# Patient Record
Sex: MALE | Race: WHITE | Employment: FULL TIME | URBAN - METROPOLITAN AREA
[De-identification: names, ages, dates, MRNs, and addresses within clinical notes are randomized per-mention and may not be internally consistent; named-entity substitution may affect disease eponyms.]

---

## 2021-03-22 ENCOUNTER — IMMUNIZATION (OUTPATIENT)
Dept: NURSING | Facility: CLINIC | Age: 56
End: 2021-03-22
Payer: COMMERCIAL

## 2021-03-22 PROCEDURE — 91300 PR COVID VAC PFIZER DIL RECON 30 MCG/0.3 ML IM: CPT

## 2021-03-22 PROCEDURE — 0001A PR COVID VAC PFIZER DIL RECON 30 MCG/0.3 ML IM: CPT

## 2021-04-12 ENCOUNTER — IMMUNIZATION (OUTPATIENT)
Dept: NURSING | Facility: CLINIC | Age: 56
End: 2021-04-12
Attending: INTERNAL MEDICINE
Payer: COMMERCIAL

## 2021-04-12 PROCEDURE — 0002A PR COVID VAC PFIZER DIL RECON 30 MCG/0.3 ML IM: CPT

## 2021-04-12 PROCEDURE — 91300 PR COVID VAC PFIZER DIL RECON 30 MCG/0.3 ML IM: CPT

## 2021-05-01 ENCOUNTER — HEALTH MAINTENANCE LETTER (OUTPATIENT)
Age: 56
End: 2021-05-01

## 2021-10-11 ENCOUNTER — HEALTH MAINTENANCE LETTER (OUTPATIENT)
Age: 56
End: 2021-10-11

## 2022-06-26 ENCOUNTER — HEALTH MAINTENANCE LETTER (OUTPATIENT)
Age: 57
End: 2022-06-26

## 2022-10-10 ENCOUNTER — HOSPITAL ENCOUNTER (OUTPATIENT)
Dept: NUCLEAR MEDICINE | Facility: CLINIC | Age: 57
Setting detail: NUCLEAR MEDICINE
Discharge: HOME | End: 2022-10-10
Attending: UROLOGY
Payer: COMMERCIAL

## 2022-10-10 ENCOUNTER — HOSPITAL ENCOUNTER (OUTPATIENT)
Dept: NUCLEAR MEDICINE | Facility: CLINIC | Age: 57
Setting detail: OBSERVATION
Discharge: HOME | End: 2022-10-10
Attending: UROLOGY
Payer: COMMERCIAL

## 2022-10-10 DIAGNOSIS — C61 MALIGNANT TUMOR OF PROSTATE (H): ICD-10-CM

## 2022-10-10 PROCEDURE — A9503 TC99M MEDRONATE: HCPCS | Performed by: UROLOGY

## 2022-10-10 PROCEDURE — 343N000001 HC RX 343: Performed by: UROLOGY

## 2022-10-10 PROCEDURE — 78306 BONE IMAGING WHOLE BODY: CPT

## 2022-10-10 RX ORDER — TC 99M MEDRONATE 20 MG/10ML
25 INJECTION, POWDER, LYOPHILIZED, FOR SOLUTION INTRAVENOUS ONCE
Status: COMPLETED | OUTPATIENT
Start: 2022-10-10 | End: 2022-10-10

## 2022-10-10 RX ADMIN — TC 99M MEDRONATE 25.1 MCI.: 20 INJECTION, POWDER, LYOPHILIZED, FOR SOLUTION INTRAVENOUS at 12:00

## 2022-10-17 ENCOUNTER — HOSPITAL ENCOUNTER (OUTPATIENT)
Facility: CLINIC | Age: 57
End: 2022-10-17
Attending: UROLOGY | Admitting: UROLOGY
Payer: COMMERCIAL

## 2022-11-20 ENCOUNTER — HEALTH MAINTENANCE LETTER (OUTPATIENT)
Age: 57
End: 2022-11-20

## 2023-04-12 LAB
ALT SERPL-CCNC: 15 U/L (ref 9–46)
AST SERPL-CCNC: 18 U/L (ref 10–35)
CREATININE (EXTERNAL): 0.83 MG/DL (ref 0.7–1.3)
GFR ESTIMATED (EXTERNAL): 103 ML/MIN/1.73M2
GLUCOSE (EXTERNAL): 94 MG/DL (ref 65–99)
HBA1C MFR BLD: 5.2 %
POTASSIUM (EXTERNAL): 4.6 MMOL/L (ref 3.5–5.3)
TSH SERPL-ACNC: 2.4 MIU/L (ref 0.4–4.5)

## 2023-04-17 ENCOUNTER — TRANSFERRED RECORDS (OUTPATIENT)
Dept: HEALTH INFORMATION MANAGEMENT | Facility: CLINIC | Age: 58
End: 2023-04-17

## 2023-04-24 ENCOUNTER — TRANSFERRED RECORDS (OUTPATIENT)
Dept: HEALTH INFORMATION MANAGEMENT | Facility: CLINIC | Age: 58
End: 2023-04-24

## 2023-07-08 ENCOUNTER — HEALTH MAINTENANCE LETTER (OUTPATIENT)
Age: 58
End: 2023-07-08

## 2023-12-04 ENCOUNTER — TRANSFERRED RECORDS (OUTPATIENT)
Dept: HEALTH INFORMATION MANAGEMENT | Facility: CLINIC | Age: 58
End: 2023-12-04
Payer: COMMERCIAL

## 2023-12-04 ENCOUNTER — MEDICAL CORRESPONDENCE (OUTPATIENT)
Dept: HEALTH INFORMATION MANAGEMENT | Facility: CLINIC | Age: 58
End: 2023-12-04
Payer: COMMERCIAL

## 2023-12-06 ENCOUNTER — TRANSCRIBE ORDERS (OUTPATIENT)
Dept: OTHER | Age: 58
End: 2023-12-06

## 2023-12-06 DIAGNOSIS — G62.9 PERIPHERAL NEUROPATHY: Primary | ICD-10-CM

## 2023-12-07 NOTE — TELEPHONE ENCOUNTER
RECORDS RECEIVED FROM: external   REASON FOR VISIT: Peripheral Neuropathy    Date of Appt: 12/11/23   NOTES (FOR ALL VISITS) STATUS DETAILS   OFFICE NOTE from referring provider Received Dr Martinez @ [facility]:  12/4/23 4/11/23   EMG Received [facility]:  4/24/23   MEDICATION LIST Received    IMAGING  (FOR ALL VISITS)     MRI (HEAD, NECK, SPINE) N/A    CT (HEAD, NECK, SPINE) N/A

## 2023-12-11 ENCOUNTER — LAB (OUTPATIENT)
Dept: LAB | Facility: CLINIC | Age: 58
End: 2023-12-11
Payer: COMMERCIAL

## 2023-12-11 ENCOUNTER — OFFICE VISIT (OUTPATIENT)
Dept: NEUROLOGY | Facility: CLINIC | Age: 58
End: 2023-12-11
Attending: PSYCHIATRY & NEUROLOGY
Payer: COMMERCIAL

## 2023-12-11 ENCOUNTER — PRE VISIT (OUTPATIENT)
Dept: NEUROLOGY | Facility: CLINIC | Age: 58
End: 2023-12-11

## 2023-12-11 VITALS
HEART RATE: 80 BPM | WEIGHT: 270 LBS | OXYGEN SATURATION: 97 % | SYSTOLIC BLOOD PRESSURE: 158 MMHG | BODY MASS INDEX: 32.88 KG/M2 | DIASTOLIC BLOOD PRESSURE: 93 MMHG | HEIGHT: 76 IN

## 2023-12-11 DIAGNOSIS — G60.9 HEREDITARY AND IDIOPATHIC PERIPHERAL NEUROPATHY: Primary | ICD-10-CM

## 2023-12-11 PROBLEM — G62.9 PERIPHERAL NEUROPATHY: Status: ACTIVE | Noted: 2023-12-11

## 2023-12-11 PROBLEM — R20.0 NUMBNESS AND TINGLING OF BOTH FEET: Status: ACTIVE | Noted: 2023-12-11

## 2023-12-11 PROBLEM — R20.2 NUMBNESS AND TINGLING OF BOTH FEET: Status: ACTIVE | Noted: 2023-12-11

## 2023-12-11 LAB — VIT B12 SERPL-MCNC: 353 PG/ML (ref 232–1245)

## 2023-12-11 PROCEDURE — 84207 ASSAY OF VITAMIN B-6: CPT | Mod: 90 | Performed by: PATHOLOGY

## 2023-12-11 PROCEDURE — 99205 OFFICE O/P NEW HI 60 MIN: CPT | Performed by: PSYCHIATRY & NEUROLOGY

## 2023-12-11 PROCEDURE — 82607 VITAMIN B-12: CPT | Performed by: PSYCHIATRY & NEUROLOGY

## 2023-12-11 PROCEDURE — 99000 SPECIMEN HANDLING OFFICE-LAB: CPT | Performed by: PATHOLOGY

## 2023-12-11 PROCEDURE — 84425 ASSAY OF VITAMIN B-1: CPT | Mod: 90 | Performed by: PATHOLOGY

## 2023-12-11 PROCEDURE — 36415 COLL VENOUS BLD VENIPUNCTURE: CPT | Performed by: PATHOLOGY

## 2023-12-11 RX ORDER — NAPROXEN SODIUM 220 MG
220 TABLET ORAL 2 TIMES DAILY WITH MEALS
COMMUNITY

## 2023-12-11 RX ORDER — IBUPROFEN 100 MG/1
100 TABLET, CHEWABLE ORAL EVERY 8 HOURS PRN
COMMUNITY

## 2023-12-11 RX ORDER — GABAPENTIN 300 MG/1
300 CAPSULE ORAL 3 TIMES DAILY
Qty: 90 CAPSULE | Refills: 5 | Status: SHIPPED | OUTPATIENT
Start: 2023-12-11

## 2023-12-11 ASSESSMENT — PAIN SCALES - GENERAL: PAINLEVEL: MODERATE PAIN (4)

## 2023-12-11 NOTE — PROGRESS NOTES
Chief Complaint: sensory changes in feet    History of Present Illness:    Timoteo Haq is a 58 year old man who I am seeing at the request of Dr. Martinez at [facility] for neuropathy evaluation. Onset of symptoms was probably around 2016. The initial location of symptoms was the top of the big toe on both sides. He did not have pain or paresthesias, but felt that the toe was numb. In the years 2016 to 2022 there was not much change in the toe symptoms. In 2022 he was diagnosed with prostate cancer and in late 2022 underwent prostate surgery. He never received chemotherapy. In 2022, before surgery, his feet changed. The sensations in his toes evolved to include paresthesias. The location spread to include the top of his foot. He has not experienced numbness on the bottom of his feet. He now also experiences sharp pain in his feet that that is intermittent, but also has constant discomfort in both feet. Walking does not worsen the pain. Sometimes the discomfort keeps him wake at night. Over the last year he has noticed that his balance has worked. He is still active with tennis, walking and hiking - but feels like he is more unsteady. No falls or gait devices.      He has not experienced changes in sweating, anhidrosis, syncope, or early satiety and has not noticed changes in bowel. Bladder changes have developed in tandem with prostate cancer. He denies frequent cramps, fasciculations, or difficulty with muscle stiffness and muscle relaxation. Speech and swallowing are normal. He lost about 100 pounds in 2022 in the 6 months leading into prostate surgery. This was an intentional weight lost.     When he saw Dr. Martinez it was recommend that he start gabapentin and PT. He has not done either of these yet.      Prior pertinent laboratory work-up:  6/23/22: Normal Hba1c (5.6), TSH  6/19/23: Negative RF  11/20/23: Negative Heb B, Hep C, HIV, YARELY, SPEP (no monoclonal protein)    Prior electrophysiologic  "work-up:  4/24/23: Nerve conduction studies/EMG performed at [facility]. Sural small or absent. CV in lower limb motor nerves all around 36 m/s. Tibial small in amplitude. Upper limb motor and sensory responses normal.     Past Medical History:   Prostate surgery    Past Surgical History:  Prostate surgery  Cholecystectomy  YESENIA  Back pain    Family history:    He thinks his mother had \"neuropathy\". Details otherwise unknown. There is no other known family history of hereditary neuropathies or other neuromuscular disorders.    Social History:    Very rare alcohol use. He denies tobacco or illicit drug use.     Medical Allergies:  No Known Allergies    Current Medications:    Current Outpatient Medications   Medication    ibuprofen (ADVIL/MOTRIN) 100 MG chewable tablet    Multiple Vitamin (MULTIVITAMINS PO)    naproxen sodium (ANAPROX) 220 MG tablet     No current facility-administered medications for this visit.     Review of Systems: A complete review of systems was obtained and was negative except for what was noted above.    Physical examination:    BP (!) 158/93   Pulse 80   Ht 1.93 m (6' 4\")   Wt 122.5 kg (270 lb)   SpO2 97%   BMI 32.87 kg/m       General Appearance: NAD    Skin: There are no rashes or other skin lesions.    Musculoskeletal:  Mild pes cavus    Neurologic examination:    Mental status:  Patient is alert, attentive, and oriented x 3.  Language is coherent and fluent without aphasia.  Memory, comprehension and ability to follow commands were intact.       Cranial nerves:  Pupils were round and reacted to light.  Extraocular movements were full. There was no face, jaw, palate or tongue weakness or atrophy. Hearing was grossly intact.  Shoulder shrug was normal.       Motor exam: No atrophy or fasciculations.  Manual muscle testing revealed MRC grade 5/5 strength throughout including proximal and distal muscles of the arms and legs.    Complex motor skills: No tremor or ataxia    Sensory exam: " Vibration reduced to about 10 seconds at ankles and a couple seconds toes. Pin reduced in distal 1/3rd of leg. Patchy but probably becomes more dense distally. Sensory exam normal in hands.     Gait: Narrow and stable.  He was able to walk on his heels and toes. He could perform tandem but was unsteady.     Deep tendon reflexes:   Right Left   Triceps 1 1   Biceps 1 1   Brachioradialis 2 2   Knee jerk 2 2   Ankle jerk 1 1   Plantar responses were flexor bilaterally.       Assessment:    Timoteo Haq is a 58 year old man with clinical findings suggestive of a mild distal sensory polyneuropathy. NCS from [facility] were curious in that motor CV were in the indeterminate range. It is also notable that, although some symptoms seem to have begun 7 years ago, there was a drastic ramp up in 2022 which occurred in the context of about a 100 pound weight loss (intentional). We discussed a diagnosis of polyneuropathy today, including idiopathic neuropathy. I would like to look for nutritional cause of neuropathy (although since his weight has stabilized it is possible that we won't be able to detect a nutritional problem even if that were the cause of his neuropathy). Regarding idiopathic polyneuropathy, this can be a frustrating diagnosis. We are learning that some late-onset (ie., symptoms beginning after age 35 years) neuropathies have a genetically determined etiology. In one series of 230 patients with late-onset neuropathy 18% were found to have a pathogenic mutation that explained their neuropathy (Steviek et al. Neurology 2020). Considering his age genetically determined etiologies are worth exploring. Although CANVAS is not a high concern, will try to include RFC1 in the genetic analysis. We discussed treatment in some detail today. In the absence of a known cause of neuropathy and in the case of genetically determined neuropathies there is unfortunately no specific disease modifying interventions. Treating the  neuropathy and preventing progression is his primary concern. Although the lack of treatment for idiopathic neuropathy is frustrating the prognosis is generally favorable. Management is supportive. We discussed supportive management of neuropathy, as detailed below. All questions answered. I will see him back in 6 months, but will be in touch with him before that when new information becomes available.     Plan:      1. Labs:B1, B12, B6  2. Referral to genetics, Jolanta Rae, for genetic testing (Invitae panel of 102 genes. Also RFC1 if able).   3. Nerve conduction studies/EMG: Characterize polyneuropathy. Special interest in indeterminate CV slowing as was reported in the Noran study  4. Balance: Physical therapy referral provided  5. Symptomatic management of pain and paresthesias: Start gabapentin 300 mg TID (provided instructions on how to slowly increase from every day to TID). Discussed side effects, including sleepiness and drowsiness. If not tolerated then Cymbalta would be a good next option  6. Encouraged supportive, comfortable footwear  7. Follow up in 6 months. Sooner if needed pending above.  --  ADDENDUM: RFC1 testing quite expensive out of pocket. Will defer testing for that as suspicion for CANVAS spectrum low    4/19/24: Genetic testing dated 3/23/24 with Invitae panel of 102 CMT genes negative. Patient called by genetics, Jolanta Rae, on 4/19/24 to discuss results.

## 2023-12-11 NOTE — LETTER
12/11/2023       RE: Timoteo Haq  [address]  [facility] [address]       Dear Colleague,    Thank you for referring your patient, Timoteo Haq, to the [facility] [place] at [facility]. Please see a copy of my visit note below.    Chief Complaint: sensory changes in feet    History of Present Illness:    Timoteo Haq is a 58 year old man who I am seeing at the request of Dr. Martinez at [facility] for neuropathy evaluation. Onset of symptoms was probably around 2016. The initial location of symptoms was the top of the big toe on both sides. He did not have pain or paresthesias, but felt that the toe was numb. In the years 2016 to 2022 there was not much change in the toe symptoms. In 2022 he was diagnosed with prostate cancer and in late 2022 underwent prostate surgery. He never received chemotherapy. In 2022, before surgery, his feet changed. The sensations in his toes evolved to include paresthesias. The location spread to include the top of his foot. He has not experienced numbness on the bottom of his feet. He now also experiences sharp pain in his feet that that is intermittent, but also has constant discomfort in both feet. Walking does not worsen the pain. Sometimes the discomfort keeps him wake at night. Over the last year he has noticed that his balance has worked. He is still active with tennis, walking and hiking - but feels like he is more unsteady. No falls or gait devices.      He has not experienced changes in sweating, anhidrosis, syncope, or early satiety and has not noticed changes in bowel. Bladder changes have developed in tandem with prostate cancer. He denies frequent cramps, fasciculations, or difficulty with muscle stiffness and muscle relaxation. Speech and swallowing are normal. He lost about 100 pounds in 2022 in the 6 months leading into prostate surgery. This was an intentional weight lost.  "    When he saw Dr. Martinez it was recommend that he start gabapentin and PT. He has not done either of these yet.      Prior pertinent laboratory work-up:  6/23/22: Normal Hba1c (5.6), TSH  6/19/23: Negative RF  11/20/23: Negative Heb B, Hep C, HIV, YARELY, SPEP (no monoclonal protein)    Prior electrophysiologic work-up:  4/24/23: Nerve conduction studies/EMG performed at [facility]. Sural small or absent. CV in lower limb motor nerves all around 36 m/s. Tibial small in amplitude. Upper limb motor and sensory responses normal.     Past Medical History:   Prostate surgery    Past Surgical History:  Prostate surgery  Cholecystectomy  YESENIA  Back pain    Family history:    He thinks his mother had \"neuropathy\". Details otherwise unknown. There is no other known family history of hereditary neuropathies or other neuromuscular disorders.    Social History:    Very rare alcohol use. He denies tobacco or illicit drug use.     Medical Allergies:  No Known Allergies    Current Medications:    Current Outpatient Medications   Medication    ibuprofen (ADVIL/MOTRIN) 100 MG chewable tablet    Multiple Vitamin (MULTIVITAMINS PO)    naproxen sodium (ANAPROX) 220 MG tablet     No current facility-administered medications for this visit.     Review of Systems: A complete review of systems was obtained and was negative except for what was noted above.    Physical examination:    BP (!) 158/93   Pulse 80   Ht 1.93 m (6' 4\")   Wt 122.5 kg (270 lb)   SpO2 97%   BMI 32.87 kg/m       General Appearance: NAD    Skin: There are no rashes or other skin lesions.    Musculoskeletal:  Mild pes cavus    Neurologic examination:    Mental status:  Patient is alert, attentive, and oriented x 3.  Language is coherent and fluent without aphasia.  Memory, comprehension and ability to follow commands were intact.       Cranial nerves:  Pupils were round and reacted to light.  Extraocular movements were full. There was no face, jaw, palate or tongue " weakness or atrophy. Hearing was grossly intact.  Shoulder shrug was normal.       Motor exam: No atrophy or fasciculations.  Manual muscle testing revealed MRC grade 5/5 strength throughout including proximal and distal muscles of the arms and legs.    Complex motor skills: No tremor or ataxia    Sensory exam: Vibration reduced to about 10 seconds at ankles and a couple seconds toes. Pin reduced in distal 1/3rd of leg. Patchy but probably becomes more dense distally. Sensory exam normal in hands.     Gait: Narrow and stable.  He was able to walk on his heels and toes. He could perform tandem but was unsteady.     Deep tendon reflexes:   Right Left   Triceps 1 1   Biceps 1 1   Brachioradialis 2 2   Knee jerk 2 2   Ankle jerk 1 1   Plantar responses were flexor bilaterally.       Assessment:    Timoteo Haq is a 58 year old man with clinical findings suggestive of a mild distal sensory polyneuropathy. NCS from [facility] were curious in that motor CV were in the indeterminate range. It is also notable that, although some symptoms seem to have begun 7 years ago, there was a drastic ramp up in 2022 which occurred in the context of about a 100 pound weight loss (intentional). We discussed a diagnosis of polyneuropathy today, including idiopathic neuropathy. I would like to look for nutritional cause of neuropathy (although since his weight has stabilized it is possible that we won't be able to detect a nutritional problem even if that were the cause of his neuropathy). Regarding idiopathic polyneuropathy, this can be a frustrating diagnosis. We are learning that some late-onset (ie., symptoms beginning after age 35 years) neuropathies have a genetically determined etiology. In one series of 230 patients with late-onset neuropathy 18% were found to have a pathogenic mutation that explained their neuropathy (John et al. Neurology 2020). Considering his age genetically determined etiologies are worth exploring. Although  CANVAS is not a high concern, will try to include RFC1 in the genetic analysis. We discussed treatment in some detail today. In the absence of a known cause of neuropathy and in the case of genetically determined neuropathies there is unfortunately no specific disease modifying interventions. Treating the neuropathy and preventing progression is his primary concern. Although the lack of treatment for idiopathic neuropathy is frustrating the prognosis is generally favorable. Management is supportive. We discussed supportive management of neuropathy, as detailed below. All questions answered. I will see him back in 6 months, but will be in touch with him before that when new information becomes available.     Plan:      1. Labs:B1, B12, B6  2. Referral to genetics, Jolanta Rae, for genetic testing (Invitae panel of `102 genes. Also RFC1 if able).   3. Nerve conduction studies/EMG: Characterize polyneuropathy. Special interest in indeterminate CV slowing as was reported in the Noran study  4. Balance: Physical therapy referral provided  5. Symptomatic management of pain and paresthesias: Start gabapentin 300 mg TID (provided instructions on how to slowly increase from every day to TID). Discussed side effects, including sleepiness and drowsiness. If not tolerated then Cymbalta would be a good next option  6. Encouraged supportive, comfortable footwear  7. Follow up in 6 months. Sooner if needed pending above.  --      Again, thank you for allowing me to participate in the care of your patient.      Sincerely,    Jh Chapin MD

## 2023-12-11 NOTE — PATIENT INSTRUCTIONS
We will do some blood tests to look for causes of your neuropathy  I made a referral to our genetics department. Jolanta Rae should be in touch with you. She will help us get genetic testing for causes of neuropathy  A referral has been made for physical therapy. They will reach out to you to work on your balance  We will set up another EMG to look at your nerves  Give gabapentin a try for the tingling and burning sensations. Start 300 mg once per day x 1 week, then twice per day x 1 week then 3 times per day

## 2023-12-11 NOTE — NURSING NOTE
Chief Complaint   Patient presents with    Consult     referral   BP (!) 158/93   Pulse 80   SpO2 97%     Jaye Cruz CMA at 7:59 AM on 12/11/2023.

## 2023-12-12 ENCOUNTER — TELEPHONE (OUTPATIENT)
Dept: CONSULT | Facility: CLINIC | Age: 58
End: 2023-12-12

## 2023-12-12 NOTE — TELEPHONE ENCOUNTER
LVM for patient to call me back directly to schedule GC only visit with Jolanta/Kasandra/Joanne in Neuro slot.

## 2023-12-14 LAB — PYRIDOXAL PHOS SERPL-SCNC: 61.3 NMOL/L

## 2023-12-15 LAB — VIT B1 PYROPHOSHATE BLD-SCNC: 141 NMOL/L

## 2024-01-08 ENCOUNTER — THERAPY VISIT (OUTPATIENT)
Dept: PHYSICAL THERAPY | Facility: CLINIC | Age: 59
End: 2024-01-08
Attending: PSYCHIATRY & NEUROLOGY
Payer: COMMERCIAL

## 2024-01-08 DIAGNOSIS — G62.9 PERIPHERAL NEUROPATHY: Primary | ICD-10-CM

## 2024-01-08 DIAGNOSIS — R26.89 IMPAIRMENT OF BALANCE: ICD-10-CM

## 2024-01-08 PROCEDURE — 97112 NEUROMUSCULAR REEDUCATION: CPT | Mod: GP

## 2024-01-08 PROCEDURE — 97161 PT EVAL LOW COMPLEX 20 MIN: CPT | Mod: GP

## 2024-01-08 NOTE — PROGRESS NOTES
PHYSICAL THERAPY EVALUATION  Type of Visit: Evaluation    See electronic medical record for Abuse and Falls Screening details.    Subjective       Presenting condition or subjective complaint: Physical therapy recommended for balance issues due to neuropathy  Date of onset: 01/01/16    Relevant medical history: Cancer; Overweight; Sleep disorder like apnea   Dates & types of surgery: 12/14/2022 Prostate cancer, 8/19/2023 Gall-bladder    Prior diagnostic imaging/testing results: EMG     Prior therapy history for the same diagnosis, illness or injury: No      Prior Level of Function  Transfers: Independent  Ambulation: Independent  ADL: Independent  IADL:  IND    Living Environment  Social support: With a significant other or spouse   Type of home: House   Stairs to enter the home: Yes 5 Is there a railing: Yes   Ramp: No   Stairs inside the home: Yes 24 Is there a railing: Yes   Help at home: None  Equipment owned:       Employment: Yes   Hobbies/Interests: Hiking, tennis, kayaking, golf    Patient goals for therapy: improve balance    Pain assessment: Pain denied     Objective   Cognitive Status Examination  Orientation: Oriented to person, place and time   Level of Consciousness: Alert  Follows Commands and Answers Questions: 100% of the time  Personal Safety and Judgement: Intact  Memory: Intact    OBSERVATION:   POSTURE: WFL  Standing Posture: Forward head   PALPATION:    RANGE OF MOTION:  WFL  STRENGTH:  B hip flexor 5/5, B quad 5/5, B ham 5/5, B DF 4+/5, B eversion 4/5, B inversion 5/5, B hip abduction 5/5, B hip extension 4/5, B gastroc 5/5    BED MOBILITY: WNL    TRANSFERS: WNL    GAIT:   Gait Description: Pt ambulates with even step lengths, very occasional misstep with R foot    BALANCE:     SPECIAL TESTS  Functional Gait Assessment (FGA) TOTAL SCORE: (MAXIMUM SCORE 30): 25  (<22/30 indicates fall risk)   10 Meter Walk Test (Comfortable)  5.09 sec = 1.18 m/s   6 Minute Walk Test (6MWT)          Valenzuela Balance Scale (BBS)    (<45/56 indicates fall risk)   5 Times Sit-to-Stand (5TSTS)       Dynamic Gait Index (DGI)     (<19/24 indicates fall risk)   Timed Up and Go (TUG) - sec    Single Leg Stance Right (sec) 15 sec   Single Leg Stance Left (sec) 15 sec   Modified CTSIB Conditions (sec) Cond 1: 30 sec  Cond 2: 30 sec  Cond 4: 30 sec  Cond 5 : 30 sec   Romberg  (sec)    Sharpened Romberg (sec) 30 sec ea side   Sharpened Romberg, EC (sec) 3 sec ea side       SENSATION:  reports numbness and paresthesias along B dorsal and medial aspects of feet    REFLEXES:   COORDINATION: WFL  MUSCLE TONE:     VESTIBULAR  Cervicogenic Screen    Neck ROM Normal     Oculomotor Screen    Ocular ROM Normal   Smooth Pursuit Normal   Saccades Normal   VOR Normal   VOR Cancellation    Head Impulse Test Abnormal and R sided corrective saccade, non-repeatable   Convergence Testing           Dynamic Visual Acuity (DVA)    Static Acuity (LogMar) 0.1   Horizontal Head Movement at 1 Hz (LogMar)    Horizontal Head Movement at 2 Hz (LogMar) 0.7   6 line difference, with slight dizziness, seated, 10 ft away         Assessment & Plan   CLINICAL IMPRESSIONS  Medical Diagnosis: Peripheral neuropathy    Treatment Diagnosis: Imbalance   Impression/Assessment: Patient is a 58 year old male with balance complaints.  The following significant findings have been identified: Impaired balance, Impaired sensation, Impaired gait, Instability, and Dizziness. These impairments interfere with their ability to perform recreational activities, household chores, and community mobility as compared to previous level of function.     Clinical Decision Making (Complexity):  Clinical Presentation: Stable/Uncomplicated  Clinical Presentation Rationale: based on medical and personal factors listed in PT evaluation  Clinical Decision Making (Complexity): Low complexity    PLAN OF CARE  Treatment Interventions:  Modalities: E-stim  Interventions: Gait Training, Manual  Therapy, Neuromuscular Re-education, Therapeutic Activity, Therapeutic Exercise    Long Term Goals     PT Goal 1  Goal Identifier: SLS  Goal Description: Pt will be able to maintain balance in SLS for at least 30 sec ea side in order to reduce risk of falls when playing tennis and hiking  Goal Progress: eval: 15 sec ea side  Target Date: 03/07/24  PT Goal 2  Goal Identifier: DVA  Goal Description: Pt will perform DVA with a 4 line degradation or less w/o dizziness in order to perform ADL's  Goal Progress: eval: 6 line difference, slight dizziness  Target Date: 03/07/24  PT Goal 3  Goal Identifier: FGA  Goal Description: Pt will score at least 28/30 on the FGA in order to indicate reduced risk of falls when ambulating in the community  Goal Progress: eval: 25/30  Target Date: 03/07/24  PT Goal 4  Goal Identifier: HEP  Goal Description: Pt will be independent with HEP at least 3x/week in order to improve self management of impairments  Target Date: 03/07/24      Frequency of Treatment: 1x/week  Duration of Treatment: 60 days    Recommended Referrals to Other Professionals:   Education Assessment:   Learner/Method: Patient    Risks and benefits of evaluation/treatment have been explained.   Patient/Family/caregiver agrees with Plan of Care.     Evaluation Time:     PT Daniel Low Complexity Minutes (55586): 30       Signing Clinician: Kathy Benítez PT

## 2024-01-15 ENCOUNTER — THERAPY VISIT (OUTPATIENT)
Dept: PHYSICAL THERAPY | Facility: CLINIC | Age: 59
End: 2024-01-15
Attending: PSYCHIATRY & NEUROLOGY
Payer: COMMERCIAL

## 2024-01-15 DIAGNOSIS — R26.89 IMPAIRMENT OF BALANCE: ICD-10-CM

## 2024-01-15 DIAGNOSIS — G60.3 IDIOPATHIC PROGRESSIVE NEUROPATHY: Primary | ICD-10-CM

## 2024-01-15 PROCEDURE — 97112 NEUROMUSCULAR REEDUCATION: CPT | Mod: GP

## 2024-01-22 ENCOUNTER — THERAPY VISIT (OUTPATIENT)
Dept: PHYSICAL THERAPY | Facility: CLINIC | Age: 59
End: 2024-01-22
Attending: PSYCHIATRY & NEUROLOGY
Payer: COMMERCIAL

## 2024-01-22 DIAGNOSIS — R26.89 IMPAIRMENT OF BALANCE: ICD-10-CM

## 2024-01-22 DIAGNOSIS — G60.3 IDIOPATHIC PROGRESSIVE NEUROPATHY: Primary | ICD-10-CM

## 2024-01-22 PROCEDURE — 97112 NEUROMUSCULAR REEDUCATION: CPT | Mod: GP

## 2024-01-24 ENCOUNTER — VIRTUAL VISIT (OUTPATIENT)
Dept: CONSULT | Facility: CLINIC | Age: 59
End: 2024-01-24
Attending: PSYCHIATRY & NEUROLOGY
Payer: COMMERCIAL

## 2024-01-24 DIAGNOSIS — G60.9 IDIOPATHIC PERIPHERAL NEUROPATHY: Primary | ICD-10-CM

## 2024-01-24 DIAGNOSIS — Z71.83 ENCOUNTER FOR NONPROCREATIVE GENETIC COUNSELING AND TESTING: ICD-10-CM

## 2024-01-24 DIAGNOSIS — Z13.71 ENCOUNTER FOR NONPROCREATIVE GENETIC COUNSELING AND TESTING: ICD-10-CM

## 2024-01-24 PROCEDURE — 96040 HC GENETIC COUNSELING, EACH 30 MINUTES: CPT | Mod: GT,95 | Performed by: GENETIC COUNSELOR, MS

## 2024-01-24 NOTE — LETTER
2024      RE: Timoteo Haq  [address]  [facility] [address]     Dear Colleague,    Thank you for the opportunity to participate in the care of your patient, Timoteo Haq, at the [facility] at [facility]. Please see a copy of my visit note below.    Virtual Visit Details    Type of service:  Video Visit     Originating Location (pt. Location): Home  {PROVIDER LOCATION On-site should be selected for visits conducted from your clinic location or adjoining [facility] hospital, academic office, or other nearby [facility] building. Off-site should be selected for all other provider locations, including home:[id number]}  Distant Location (provider location):  On-site  Platform used for Video Visit: Saulo    Name:  Timoteo Haq  :   1965  MRN:   [id number]  Date of service: 2024  Referring Provider: Jh Chapin    Genetic Counseling Consultation Note    Presenting Information:  A consultation in the [facility] was requested for Timoteo, a 58 year old male, for evaluation of neuropathy. This consultation was requested by Dr. Chapin in Adult Neurology at the patient's visit on 2023.    Timoteo attended this visit conducted by video alone.    History is obtained from Timoteo and the medical record. I met with the family at the request of Dr. Chapin to obtain a personal and family history, discuss possible genetic contributions to his symptoms, and to obtain informed consent for genetic testing.    Personal History:   Timoteo has a history of neuropathy with onset in his mid/late 40s (he reports his first symptoms occurred in ). Timoteo also has a history of prostate cancer.     Patient Active Problem List   Diagnosis     Peripheral neuropathy     Numbness and tingling of both feet     Previous Genetic Testing  Timoteo reports that his Urologist ordered genetic testing for  Timoteo after his diagnosis of prostate cancer. 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 A direct copy of the report was not available for my review.    Family History:   A standard three generation pedigree was obtained and is scanned into the medical record.  History pertinent to referral is underlined.  Children: NA  Siblings:   Full siblings:   60 y/o brother, history of prostate cancer diagnosed at 55 y/o  Paternal:   Father:  at 71 y/o d/t idiopathic pulmonary fibrosis  Paternal grandfather:  in 60s d/t stomach cancer  Paternal grandmother:  in 90s  Paternal aunts/uncles:   Uncle  d/t complications of pulmonary fibrosis  Aunt, living and no health concerns known  10 additional  aunts and uncles for whom no health information is known  Paternal cousins: Numerous, at least 1 male cousin  d/t complications of MI  Maternal:   Mother:  at 76 y/o. She is noted to have neuropathy. She also has a history of breast cancer diagnosed in her late 60s  Maternal grandfather:  in early 90s  Maternal grandmother:  in early 90s. History of breast cancer  Maternal aunts/uncles:   3 aunts, all of whom had breast cancer. 2 of these aunts are in their late 80s and 1  in their 90s  Uncle who is     There are no additional reports of family members with neuropathy or cancer. Paternal ancestry is of [place] descent.  Maternal ancestry is of [place] descent. Consanguinity is denied.     Genetic Counseling Discussion:  Discussed with Timoteo that his personal and family history of cancer, especially considering his positive genetic test results, should be reviewed with an expert. Timoteo states that he has been referred to the cancer genetic counseling group at [facility], but has not scheduled an appointment yet. I encouraged him to do so. We discussed that the HOXB13 gene has been associated  with increased risk for prostate cancer. To my knowledge, this gene has not been associated with neuropathy and would not be included on the genetic testing that I order.    Our nervous system is composed of two parts: the central nervous system and the peripheral nervous system.  The central nervous system refers to the brain and spinal cord.  The peripheral nervous system refers to the nerves that branch off from the spinal cord and connect to all the parts of our body.  The central nervous system and peripheral nervous system are in constant communication, sending signals to and from each other.  Neuropathy is a medical condition that refers to the damage or reduced function of nerves.  One subset of neuropathy is peripheral neuropathy, referring to neuropathy of the peripheral nervous system.      Neuropathy can be due to many causes, including both acquired forms and genetic forms.  Some examples of acquired causes of peripheral neuropathy are trauma, diabetes, and autoimmune disease.  However, there are countless other causes of acquired peripheral neuropathy.  Genetic causes of peripheral neuropathy are much rarer than acquired forms of neuropathy. When neuropathy is due to a genetic cause, there may or may not be a family history of neuropathy or other symptoms associated with the condition.    At this time, we are unable to target genetic testing for a specific condition or gene for Timoteo.  In situations like this, we recommend examining numerous genes associated with the presenting symptom.  For Timoteo, we are targeting genes associated with neuropathy.  We discussed the details, limitations, and possible outcomes of next generation sequencing gene panels.  There are three types of results:  Negative: meaning no pathogenic variants were identified in the genes that were tested  A negative result does not rule out the possibility that Timoteo's symptoms are due to a genetic etiology  Positive: meaning one  (or more) pathogenic variants were identified in the genes that were tested that are associated with Timoteo's symptoms  We discussed that a positive result could provide an etiology to Timoteo's symptoms, give anticipatory guidance as to their potential progression, and clarify risks to family members.  We also discussed that a positive result could indicate that Timoteo is at risks for other health concerns, outside of their presenting symptoms  Uncertain: meaning one (or more) variants were identified in the genes that were tested, but there is not enough data to know if this variant is disease-causing; these are called variants of uncertain significance (VUS)  In most cases, identification of a VUS does not confirm a diagnosis and does not result in any clinically actionable recommendations.  The variant will be monitored over time to see if more information is known about it in the future.  If a VUS is identified, testing of other relatives may be helpful to provide clarification.    We discussed the potential benefits of genetic testing and why this genetic testing is medically indicated. A positive result will help determine the etiology of neuropathy noted in Timoteo and could guide medical management for Timoteo. If a genetic cause is found for Timoteo, it will give us a more accurate risk assessment for other family members.  Thus, the recommended testing for Timoteo  is DIAGNOSTIC testing, and it is NOT investigational.    [employer] offers sponsored testing programs. Participation in these programs is completely voluntary. Under these programs, third party biopharmaceutical companies sponsor the cost of testing for eligible patients. If Timoteo chooses to participate in a sponsored testing program, he agrees that [facility] may share their de-identified genetic test results and clinical information and the contact information of their healthcare provider with the sponsor(s), who may contact their healthcare provider.  he understands that he may have the right under the law to withdraw their consent to share their information with the sponsor(s). he can withdraw their consent by emailing their request to [email or web address]. The withdrawal of consent will not apply to data that has already been shared by [employer].    Plan:  1. Timoteo expressed verbal informed consent to proceed with genetic testing ([employer] Comprehensive Neuropathies Panel) via their insurance benefits. I will mail a buccal collection kit to their home address. Timoteo will follow the included instructions and mail back to the laboratory.     The laboratory ([employer]) will send Timoteo a text message describing the insurance billing process. If Timoteo owes more than $100 after insurance processes their claim, an [employer]  will proactively call to discuss their options.   More information can be found here: [email or web address]    2. The results of genetic testing are available ~3 weeks after the sample is received by the laboratory.  I will call Timoteo with the results when they are available. Results will not be left via voicemail, regardless of outcome.  3. Contact information provided.    Jolanta Rae MS [facility]  Genetic Counselor  Email: [email or web address]  Phone: [phone]  Pager: [phone]    Total Time Spent in Consultation: Approximately 34 minutes    CC: No Letter    References:  National [place] of Neurological Disorders and Stroke (2020). Peripheral Neuropathy Fact Sheet. Retrieved September 7, 2020, from https://www.ninds.nih.gov/Disorders/Patient-Caregiver-Education/Fact-Sheets/Peripheral-Neuropathy-Fact-Sheet.    Please do not hesitate to contact me if you have any questions/concerns.     Sincerely,       Jolanta Rae GC

## 2024-01-30 NOTE — PROGRESS NOTES
Virtual Visit Details    Type of service:  Video Visit     Originating Location (pt. Location): Home    Distant Location (provider location):  On-site  Platform used for Video Visit: Saulo    Name:  Timoteo Haq  :   1965  MRN:   [id number]  Date of service: 2024  Referring Provider: Jh Chapin    Genetic Counseling Consultation Note    Presenting Information:  A consultation in the [facility] was requested for Timoteo, a 58 year old male, for evaluation of neuropathy. This consultation was requested by Dr. Chapin in Adult Neurology at the patient's visit on 2023.    Timoteo attended this visit conducted by video alone.    History is obtained from Timoteo and the medical record. I met with the family at the request of Dr. Chapin to obtain a personal and family history, discuss possible genetic contributions to his symptoms, and to obtain informed consent for genetic testing.    Personal History:   Timoteo has a history of neuropathy with onset in his mid/late 40s (he reports his first symptoms occurred in ). Timoteo also has a history of prostate cancer.     Patient Active Problem List   Diagnosis    Peripheral neuropathy    Numbness and tingling of both feet     Previous Genetic Testing  Timoteo reports that his Urologist ordered genetic testing for Timoteo after his diagnosis of prostate cancer. 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 A direct copy of the report was not available for my review.    Family History:   A standard three generation pedigree was obtained and is scanned into the medical record.  History pertinent to referral is underlined.  Children: NA  Siblings:   Full siblings:   60 y/o brother, history of prostate cancer diagnosed at 57 y/o  Paternal:   Father:  at 71 y/o d/t idiopathic pulmonary fibrosis  Paternal grandfather:  in 60s d/t  stomach cancer  Paternal grandmother:  in 90s  Paternal aunts/uncles:   Uncle  d/t complications of pulmonary fibrosis  Aunt, living and no health concerns known  10 additional  aunts and uncles for whom no health information is known  Paternal cousins: Numerous, at least 1 male cousin  d/t complications of MI  Maternal:   Mother:  at 74 y/o. She is noted to have neuropathy. She also has a history of breast cancer diagnosed in her late 60s  Maternal grandfather:  in early 90s  Maternal grandmother:  in early 90s. History of breast cancer  Maternal aunts/uncles:   3 aunts, all of whom had breast cancer. 2 of these aunts are in their late 80s and 1  in their 90s  Uncle who is     There are no additional reports of family members with neuropathy or cancer. Paternal ancestry is of [place] descent.  Maternal ancestry is of [place] descent. Consanguinity is denied.     Genetic Counseling Discussion:  Discussed with Timoteo that his personal and family history of cancer, especially considering his positive genetic test results, should be reviewed with an expert. Timoteo states that he has been referred to the cancer genetic counseling group at [facility], but has not scheduled an appointment yet. I encouraged him to do so. We discussed that the HOXB13 gene has been associated with increased risk for prostate cancer. To my knowledge, this gene has not been associated with neuropathy and would not be included on the genetic testing that I order.    Our nervous system is composed of two parts: the central nervous system and the peripheral nervous system.  The central nervous system refers to the brain and spinal cord.  The peripheral nervous system refers to the nerves that branch off from the spinal cord and connect to all the parts of our body.  The central nervous system and peripheral nervous system are in constant communication, sending signals to and from each other.  Neuropathy  is a medical condition that refers to the damage or reduced function of nerves.  One subset of neuropathy is peripheral neuropathy, referring to neuropathy of the peripheral nervous system.      Neuropathy can be due to many causes, including both acquired forms and genetic forms.  Some examples of acquired causes of peripheral neuropathy are trauma, diabetes, and autoimmune disease.  However, there are countless other causes of acquired peripheral neuropathy.  Genetic causes of peripheral neuropathy are much rarer than acquired forms of neuropathy. When neuropathy is due to a genetic cause, there may or may not be a family history of neuropathy or other symptoms associated with the condition.    At this time, we are unable to target genetic testing for a specific condition or gene for Timoteo.  In situations like this, we recommend examining numerous genes associated with the presenting symptom.  For Timoteo, we are targeting genes associated with neuropathy.  We discussed the details, limitations, and possible outcomes of next generation sequencing gene panels.  There are three types of results:  Negative: meaning no pathogenic variants were identified in the genes that were tested  A negative result does not rule out the possibility that Timoteo's symptoms are due to a genetic etiology  Positive: meaning one (or more) pathogenic variants were identified in the genes that were tested that are associated with Timoteo's symptoms  We discussed that a positive result could provide an etiology to Timoteo's symptoms, give anticipatory guidance as to their potential progression, and clarify risks to family members.  We also discussed that a positive result could indicate that Timoteo is at risks for other health concerns, outside of their presenting symptoms  Uncertain: meaning one (or more) variants were identified in the genes that were tested, but there is not enough data to know if this variant is disease-causing; these  are called variants of uncertain significance (VUS)  In most cases, identification of a VUS does not confirm a diagnosis and does not result in any clinically actionable recommendations.  The variant will be monitored over time to see if more information is known about it in the future.  If a VUS is identified, testing of other relatives may be helpful to provide clarification.    We discussed the potential benefits of genetic testing and why this genetic testing is medically indicated. A positive result will help determine the etiology of neuropathy noted in Timoteo and could guide medical management for Timoteo. If a genetic cause is found for Timoteo, it will give us a more accurate risk assessment for other family members.  Thus, the recommended testing for Timoteo  is DIAGNOSTIC testing, and it is NOT investigational.    [employer] offers sponsored testing programs. Participation in these programs is completely voluntary. Under these programs, third party biopharmac[employer]at[employer] companies sponsor the cost of testing for eligible patients. If Timoteo chooses to participate in a sponsored testing program, he agrees that [employer] may share their de-identified genetic test results and clinical information and the contact information of their healthcare provider with the sponsor(s), who may contact their healthcare provider. he understands that he may have the right under the law to withdraw their consent to share their information with the sponsor(s). he can withdraw their consent by emailing their request to [email or web address]. The withdrawal of consent will not apply to data that has already been shared by [employer].    Plan:  1. Timoteo expressed verbal informed consent to proceed with genetic testing ([employer] Comprehensive Neuropathies Panel) via their insurance benefits. I will mail a buccal collection kit to their home address. Timoteo will follow the included instructions and mail back to the laboratory.     The laboratory  ([employer]) will send Timoteo a text message describing the insurance billing process. If Timoteo owes more than $100 after insurance processes their claim, an [employer]  will proactively call to discuss their options.   More information can be found here: [email or web address]    2. The results of genetic testing are available ~3 weeks after the sample is received by the laboratory.  I will call Timoteo with the results when they are available. Results will not be left via voicemail, regardless of outcome.  3. Contact information provided.    Jolanta Rae MS [facility]  Genetic Counselor  Email: [email or web address]  Phone: [phone]  Pager: [phone]    Total Time Spent in Consultation: Approximately 34 minutes    CC: No Letter    References:  National [place] of Neurological Disorders and Stroke (2020). Peripheral Neuropathy Fact Sheet. Retrieved September 7, 2020, from https://www.ninds.nih.gov/Disorders/Patient-Caregiver-Education/Fact-Sheets/Peripheral-Neuropathy-Fact-Sheet.

## 2024-02-09 ENCOUNTER — OFFICE VISIT (OUTPATIENT)
Dept: NEUROLOGY | Facility: CLINIC | Age: 59
End: 2024-02-09
Payer: COMMERCIAL

## 2024-02-09 DIAGNOSIS — G60.9 HEREDITARY AND IDIOPATHIC PERIPHERAL NEUROPATHY: Primary | ICD-10-CM

## 2024-02-09 DIAGNOSIS — R20.0 NUMBNESS AND TINGLING OF BOTH FEET: ICD-10-CM

## 2024-02-09 DIAGNOSIS — R20.2 NUMBNESS AND TINGLING OF BOTH FEET: ICD-10-CM

## 2024-02-09 PROCEDURE — 95912 NRV CNDJ TEST 11-12 STUDIES: CPT | Performed by: PHYSICAL MEDICINE & REHABILITATION

## 2024-02-09 PROCEDURE — 95885 MUSC TST DONE W/NERV TST LIM: CPT | Performed by: PHYSICAL MEDICINE & REHABILITATION

## 2024-02-09 NOTE — PROGRESS NOTES
[facility]  Electrodiagnostic Laboratory                 Department of Neurology                                                                                                         Test Date:  2024    Patient: Donaldo Haq : 1965 Physician: Dalia Estrella MD   Sex: Male AGE: 58 year Ref Phys: Jh Chapin MD   ID#: [id number]   Technician: Joanie Guerra     History and Examination:  Timoteo Haq is a 57 yo male who presents with pain and numbness in his feet due to polyneuropathy. Query comparison to the prior study done in 2023.    Techniques:  Motor conduction studies were done with surface recording electrodes. Sensory conduction studies were performed with surface electrodes, unless indicated otherwise by (n), designating the use of subdermal recording electrodes. Temperature was monitored and recorded throughout the study. Upper extremities were maintained at a temperature of 32 degrees Centigrade or higher.  EMG was done with a concentric needle electrode.       Results:  Evaluation of the left Tibial (AHB) motor, the right Tibial (AHB) motor, and the right Superficial Fibular sensory nerves showed reduced amplitude (L3.2, R2.8, R4  V).  The left Superficial Fibular sensory, the left sural sensory, and the right sural sensory nerves showed no response.  All remaining nerves (as indicated in the following tables) were within normal limits.      F Wave studies indicate that the left tibial F wave has prolonged minimum latency (66.8 ms).  All remaining F Wave latencies were within normal limits.      Needle evaluation of the left Tibialis Anterior muscle showed reduced recruitment.  All remaining muscles (as indicated in the following table) showed no evidence of electrical instability.          Interpretation:  Abnormal study.     --There is electrodiagnostic evidence of a mild sensorimotor, length dependent, axonal polyneuropathy.     These  finding are mostly unchanged compared to the previous study in April 2023, however all the motor nerve conduction velocities were within normal range. Clinical correlation is recommended.         _____________________________  Dalia Estrella MD         Nerve Conduction Studies  Motor Sites      Latency Amplitude Neg. Amp Diff Segment Distance Velocity Neg. Dur Neg Area Diff Temperature Comment   Site (ms) Norm (mV) Norm (%)  cm m/s Norm (ms) (%) ( C)    Left Fibular (EDB) Motor   Ankle 4.3  < 6.0 3.5  > 2.5  Ankle-EDB 8   4.5  31.8    Bel Fib Head 13.4 - 3.4 - -3 Bel Fib Head-Ankle 35.7 39  > 38 5.6 2 31.8    Pop Fossa 16.2 - 3.4 - 0 Pop Fossa-Bel Fib Head 12 43  > 38 5.4 -3 31.7    Right Fibular (EDB) Motor   Ankle 4.7  < 6.0 3.9  > 2.5  Ankle-EDB 8   4.1  32.8    Bel Fib Head 13.3 - 2.5 - -36 Bel Fib Head-Ankle 35 41  > 38 5.0 -19 32.7    Pop Fossa 16.1 - 2.5 - 0 Pop Fossa-Bel Fib Head 13.2 47  > 38 5.3 -4 32.6    Right Median (APB) Motor   Wrist 3.6  < 4.4 7.7  > 5.0  Wrist-APB 8   4.6  29.8    Elbow 8.6 - 7.0  > 5.0 -9 Elbow-Wrist 27.5 55  > 48 4.6 -10 29.3    Left Tibial (AHB) Motor   Ankle 4.2  < 6.5 3.2  > 5.0  Ankle-AHB 8.5   5.9  31.4    Knee 15.5 - 2.5 - -22 Knee-Ankle 47.5 42  > 38 7.4 -11 31.3    Right Tibial (AHB) Motor   Ankle 3.2  < 6.5 2.8  > 5.0  Ankle-AHB 9.5   7.3  32.5 moved G1   Knee 14.9 - 1.97 - -30 Knee-Ankle 45.2 39  > 38 9.2 -14 32.4      F-Wave Sites      Min F-Lat Max-Min F-Lat Mean F-Lat   Site (ms) (ms) (ms)   Left Fibular F-Wave   Ankle 59.9 13.5 -   Left Tibial F-Wave   Ankle 66.8 11.9 -     Sensory Sites      Onset Lat Peak Lat Amp (O-P) Amp (P-P) Segment Distance Velocity Temperature Comment   Site ms (ms)  V Norm ( V)  cm m/s Norm ( C)    Right Radial Sensory   Forearm-Wrist 1.50 2.1 19  > 15 29 Forearm-Wrist 10 67 - 31.3    Left Superficial Fibular Sensory   14 cm-Ankle NR NR NR  > 3 NR 14 cm-Ankle 12.5 NR  > 38 31    Right Superficial Fibular Sensory   14 cm-Ankle 0.93 1.80 4  >  3 4 14 cm-Ankle 12.5 134  > 38 32.1    Left Sural Sensory   Calf-Lat Mall NR NR NR  > 5 NR Calf-Lat Mall 14 NR  > 38 30.9    Right Sural Sensory   Calf-Lat Mall NR NR NR  > 5 NR Calf-Lat Mall 14 NR  > 38 32.4        Electromyography     Side Muscle Ins Act Fibs/PSW Fasc HF Amp Dur Poly Recrt Int Pat   Right Tib Anterior Nml None Nml 0 Nml Nml 0 Nml Nml   Right Gastroc Nml None Nml 0 Nml Nml 0 Nml Nml   Right Vastus Lat Nml None Nml 0 Nml Nml 0 Nml Nml   Left Tib Anterior Nml None Nml 0 Nml Nml 0 Shahbaz Nml   Left Gastroc Nml None Nml 0 Nml Nml 0 Nml Nml   Left Vastus Lat Nml None Nml 0 Nml Nml 0 Nml Nml         NCS Waveforms:    Motor                  Sensory                  F-Wave

## 2024-02-09 NOTE — LETTER
2024       RE: Timoteo Haq  [address]  [facility] [address]       Dear Colleague,    Thank you for referring your patient, Timoteo Haq, to the [facility] EMG CLINIC [place] at [facility]. Please see a copy of my visit note below.                              [facility]  Electrodiagnostic Laboratory                 Department of Neurology                                                                                                         Test Date:  2024    Patient: Donaldo Haq : 1965 Physician: Dalia Estrella MD   Sex: Male AGE: 58 year Ref Phys: Jh Chapin MD   ID#: [id number]   Technician: Joanie Guerra     History and Examination:  Timoteo Haq is a 57 yo male who presents with pain and numbness in his feet due to polyneuropathy. Query comparison to the prior study done in 2023.    Techniques:  Motor conduction studies were done with surface recording electrodes. Sensory conduction studies were performed with surface electrodes, unless indicated otherwise by (n), designating the use of subdermal recording electrodes. Temperature was monitored and recorded throughout the study. Upper extremities were maintained at a temperature of 32 degrees Centigrade or higher.  EMG was done with a concentric needle electrode.       Results:  Evaluation of the left Tibial (AHB) motor, the right Tibial (AHB) motor, and the right Superficial Fibular sensory nerves showed reduced amplitude (L3.2, R2.8, R4  V).  The left Superficial Fibular sensory, the left sural sensory, and the right sural sensory nerves showed no response.  All remaining nerves (as indicated in the following tables) were within normal limits.      F Wave studies indicate that the left tibial F wave has prolonged minimum latency (66.8 ms).  All remaining F Wave latencies were within normal limits.      Needle evaluation of the left Tibialis  Anterior muscle showed reduced recruitment.  All remaining muscles (as indicated in the following table) showed no evidence of electrical instability.          Interpretation:  Abnormal study.     --There is electrodiagnostic evidence of a mild sensorimotor, length dependent, axonal polyneuropathy.     These finding are mostly unchanged compared to the previous study in April 2023, however all the motor nerve conduction velocities were within normal range. Clinical correlation is recommended.         _____________________________  Dalia Estrella MD         Nerve Conduction Studies  Motor Sites      Latency Amplitude Neg. Amp Diff Segment Distance Velocity Neg. Dur Neg Area Diff Temperature Comment   Site (ms) Norm (mV) Norm (%)  cm m/s Norm (ms) (%) ( C)    Left Fibular (EDB) Motor   Ankle 4.3  < 6.0 3.5  > 2.5  Ankle-EDB 8   4.5  31.8    Bel Fib Head 13.4 - 3.4 - -3 Bel Fib Head-Ankle 35.7 39  > 38 5.6 2 31.8    Pop Fossa 16.2 - 3.4 - 0 Pop Fossa-Bel Fib Head 12 43  > 38 5.4 -3 31.7    Right Fibular (EDB) Motor   Ankle 4.7  < 6.0 3.9  > 2.5  Ankle-EDB 8   4.1  32.8    Bel Fib Head 13.3 - 2.5 - -36 Bel Fib Head-Ankle 35 41  > 38 5.0 -19 32.7    Pop Fossa 16.1 - 2.5 - 0 Pop Fossa-Bel Fib Head 13.2 47  > 38 5.3 -4 32.6    Right Median (APB) Motor   Wrist 3.6  < 4.4 7.7  > 5.0  Wrist-APB 8   4.6  29.8    Elbow 8.6 - 7.0  > 5.0 -9 Elbow-Wrist 27.5 55  > 48 4.6 -10 29.3    Left Tibial (AHB) Motor   Ankle 4.2  < 6.5 3.2  > 5.0  Ankle-AHB 8.5   5.9  31.4    Knee 15.5 - 2.5 - -22 Knee-Ankle 47.5 42  > 38 7.4 -11 31.3    Right Tibial (AHB) Motor   Ankle 3.2  < 6.5 2.8  > 5.0  Ankle-AHB 9.5   7.3  32.5 moved G1   Knee 14.9 - 1.97 - -30 Knee-Ankle 45.2 39  > 38 9.2 -14 32.4      F-Wave Sites      Min F-Lat Max-Min F-Lat Mean F-Lat   Site (ms) (ms) (ms)   Left Fibular F-Wave   Ankle 59.9 13.5 -   Left Tibial F-Wave   Ankle 66.8 11.9 -     Sensory Sites      Onset Lat Peak Lat Amp (O-P) Amp (P-P) Segment Distance Velocity  Temperature Comment   Site ms (ms)  V Norm ( V)  cm m/s Norm ( C)    Right Radial Sensory   Forearm-Wrist 1.50 2.1 19  > 15 29 Forearm-Wrist 10 67 - 31.3    Left Superficial Fibular Sensory   14 cm-Ankle NR NR NR  > 3 NR 14 cm-Ankle 12.5 NR  > 38 31    Right Superficial Fibular Sensory   14 cm-Ankle 0.93 1.80 4  > 3 4 14 cm-Ankle 12.5 134  > 38 32.1    Left Sural Sensory   Calf-Lat Mall NR NR NR  > 5 NR Calf-Lat Mall 14 NR  > 38 30.9    Right Sural Sensory   Calf-Lat Mall NR NR NR  > 5 NR Calf-Lat Mall 14 NR  > 38 32.4        Electromyography     Side Muscle Ins Act Fibs/PSW Fasc HF Amp Dur Poly Recrt Int Pat   Right Tib Anterior Nml None Nml 0 Nml Nml 0 Nml Nml   Right Gastroc Nml None Nml 0 Nml Nml 0 Nml Nml   Right Vastus Lat Nml None Nml 0 Nml Nml 0 Nml Nml   Left Tib Anterior Nml None Nml 0 Nml Nml 0 Shahbaz Nml   Left Gastroc Nml None Nml 0 Nml Nml 0 Nml Nml   Left Vastus Lat Nml None Nml 0 Nml Nml 0 Nml Nml         NCS Waveforms:    Motor                  Sensory                  F-Wave               Again, thank you for allowing me to participate in the care of your patient.      Sincerely,    Dalia Estrella MD

## 2024-03-15 ENCOUNTER — TELEPHONE (OUTPATIENT)
Dept: CONSULT | Facility: CLINIC | Age: 59
End: 2024-03-15
Payer: COMMERCIAL

## 2024-03-15 NOTE — TELEPHONE ENCOUNTER
Called Timoteo to review that no sample has been received for genetic testing. He was not available and a non-detailed VM with contact information was left.    Jolanta Rae MS [facility]  Genetic Counselor  Email: [email or web address]  Phone: [phone]  Pager: [phone]

## 2024-03-22 NOTE — PROGRESS NOTES
No genetic testing in process (no sample collected for genetic testing). Referring provider notified.    Jolanta Rae MS [facility]  Genetic Counselor  Email: [email or web address]  Phone: [phone]  Pager: [phone]

## 2024-04-04 ENCOUNTER — TELEPHONE (OUTPATIENT)
Dept: CONSULT | Facility: CLINIC | Age: 59
End: 2024-04-04
Payer: COMMERCIAL

## 2024-04-04 NOTE — TELEPHONE ENCOUNTER
Timoteo left me a VM and when I returned the call he expressed that he would like to proceed with genetic testing. However, he would like to proceed via sponsored testing this time (not through insurance coverage). He has a collection kit which he will complete at his convenience and I will place new orders for genetic testing.    Jolanta Rae MS [facility]  Genetic Counselor  Email: [email or web address]  Phone: [phone]  Pager: [phone]

## 2024-04-18 ENCOUNTER — TELEPHONE (OUTPATIENT)
Dept: CONSULT | Facility: CLINIC | Age: 59
End: 2024-04-18
Payer: COMMERCIAL

## 2024-04-18 NOTE — TELEPHONE ENCOUNTER
I called Timoteo to discuss the results of genetic testing. Our initial consultation occurred on 1/24/2024 where informed consent was obtained for genetic testing. Timoteo was not available and a VM was left.    The following information has not yet been reviewed with Timoteo:  The results of [employer] Comprehensive Neuropathies Panel (102 genes) were negative/normal.      Personal History:   Briefly, Timoteo has a history of neuropathy diagnosed in his mid to late 40s.    Previous Genetic Testing  Timoteo reports that his Urologist ordered genetic testing for him after his diagnosis of prostate cancer. 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 A direct copy of the report was not available for my review     Family History:   A standard three generation pedigree was previously obtained. His mother is reported to have a history of neuropathy.    Assessment:  These results did not identify a genetic cause to Timoteo's neuropathy. These results reduce the likelihood of a genetic etiology, but do not exclude it completely.     Plan:  Results mailed to Timoteo for their records.  Follow-up with Dr. Chapin is scheduled for June 2024.     Jolanta Rae MS [facility]  Genetic Counselor  Email: [email or web address]  Phone: [phone]  Pager: [phone]

## 2024-04-22 NOTE — TELEPHONE ENCOUNTER
Called Timoteo to review genetic test results and he was not available. A VM with contact information was left. I will plan to send Timoteo a [facility] message with this information instead.

## 2024-06-19 ENCOUNTER — OFFICE VISIT (OUTPATIENT)
Dept: NEUROLOGY | Facility: CLINIC | Age: 59
End: 2024-06-19
Payer: COMMERCIAL

## 2024-06-19 VITALS
SYSTOLIC BLOOD PRESSURE: 126 MMHG | DIASTOLIC BLOOD PRESSURE: 78 MMHG | RESPIRATION RATE: 16 BRPM | HEART RATE: 72 BPM | OXYGEN SATURATION: 97 %

## 2024-06-19 DIAGNOSIS — G60.9 HEREDITARY AND IDIOPATHIC PERIPHERAL NEUROPATHY: Primary | ICD-10-CM

## 2024-06-19 PROCEDURE — 99214 OFFICE O/P EST MOD 30 MIN: CPT | Mod: GC | Performed by: PSYCHIATRY & NEUROLOGY

## 2024-06-19 PROCEDURE — G2211 COMPLEX E/M VISIT ADD ON: HCPCS | Performed by: PSYCHIATRY & NEUROLOGY

## 2024-06-19 NOTE — PROGRESS NOTES
History of neuropathy:    Timoteo Haq is a 58 year old man with idiopathic peripheral neuropathy.  Onset of symptoms was probably around 2016, initially starting on the top of the big toes bilaterally. He did not have pain or paresthesias, but felt that the toe was numb. In the years 2016 to 2022 there was not much change in the toe symptoms. In 2022 he was diagnosed with prostate cancer and in late 2022 underwent prostate surgery. He never received chemotherapy. In 2022, before surgery, his feet changed. The sensations in his toes evolved to include paresthesias. The location spread to include the top of his feet, with notable sparing of the bottom of the feet.  This has been accompanied by intermittent sharp pains of the feet.  Walking does not worsen the pain.  Starting in 2023 his balance worsened, but he is still very active with tennis, walking and hiking, but felt like he was more unsteady. No falls or gait devices.  He has not experienced changes in sweating, anhidrosis, syncope, or early satiety and has not noticed changes in bowel. Bladder changes have developed in tandem with prostate cancer. He lost about 100 pounds in 2022 in the 6 months leading into prostate surgery. This was an intentional weight lost. He describes some subjective changes to the feet and toes, but has continues to be functionally the same, still undergoing long hiking trips without falling and not needing any gait aids.     Interval:  We last saw him 12/11/2023.  Since that time he endorses subjective worsening of paresthesias and possibly pain in the feet and toes bilaterally.  He takes gabapentin 300 mg nightly which helps him get to sleep and stay asleep due to reducing cramping and pain of the feet at night.  Most of the symptoms are later in the day and at night, with occasional pain during the day for which she takes Tylenol ibuprofen.  He is worried about the brain fog side effect of gabapentin but is only tried it  "during the day on 1 or 2 occasions.  He is willing to try gabapentin in the daytime.  He also undergoes physical therapy for which she was given many exercises that he thinks have helped his coordination and balance quite a bit, but thinks he needs to be more diligent about it at home.  He has been more cautious with walking and denies falls.  He continues to play tennis most days throughout the summer and is able to hike long distances without any significant fatigue or falls.  He has several questions today regarding results of the more updated EMG in February, as well as how we can maximize his function and limit symptoms from the neuropathy.    Prior pertinent laboratory work-up:  6/23/22: Normal Hba1c (5.6), TSH  6/19/23: Negative RF  11/20/23: Negative Heb B, Hep C, HIV, YARELY, SPEP (no monoclonal protein)  12/11/2023: B12 353, B1 whole blood 141, B6 61.3  4/19/24: Genetic testing dated 3/23/24 with [employer] panel of 102 CMT genes negative.    Prior electrophysiologic work-up:  4/24/23: Nerve conduction studies/EMG performed at [facility]. Sural small or absent. CV in lower limb motor nerves all around 36 m/s. Tibial small in amplitude. Upper limb motor and sensory responses normal.   2/9/24:     Past Medical History:   Prostate surgery    Past Surgical History:  Prostate surgery  Cholecystectomy  YESENIA  Back pain    Family history:    He thinks his mother had \"neuropathy\". Details otherwise unknown. There is no other known family history of hereditary neuropathies or other neuromuscular disorders.    Social History:    Very rare alcohol use. He denies tobacco or illicit drug use.     Medical Allergies:  No Known Allergies    Current Medications:    Current Outpatient Medications   Medication Sig Dispense Refill    gabapentin (NEURONTIN) 300 MG capsule Take 1 capsule (300 mg) by mouth 3 times daily 90 capsule 5    ibuprofen (ADVIL/MOTRIN) 100 MG chewable tablet Take 100 mg by mouth every 8 hours as needed for fever      " Multiple Vitamin (MULTIVITAMINS PO)       naproxen sodium (ANAPROX) 220 MG tablet Take 220 mg by mouth 2 times daily (with meals)       No current facility-administered medications for this visit.     Review of Systems: A complete review of systems was obtained and was negative except for what was noted above.    Physical examination:    /78 (BP Location: Right arm, Patient Position: Sitting, Cuff Size: Adult Large)   Pulse 72   Resp 16   SpO2 97%      General Appearance: NAD    Skin: There are no rashes or other skin lesions.    Musculoskeletal:  Mild pes cavus    Neurologic examination:    Mental status:  Patient is alert, attentive, and oriented x 3.  Language is coherent and fluent without aphasia.  Memory, comprehension and ability to follow commands were intact.       Cranial nerves:  Pupils were round and reacted to light.  Extraocular movements were full. There was no face, jaw, palate or tongue weakness or atrophy. Hearing was grossly intact.  Shoulder shrug was normal.       Motor exam: No atrophy or fasciculations.  Manual muscle testing revealed MRC grade 5/5 strength throughout including proximal and distal muscles of the arms and legs.    Complex motor skills: No tremor or ataxia    Sensory exam: Vibration reduced to about 10 seconds at ankles and 2-3 seconds at toes. Pin reduced in distal 1/3rd of leg. Patchy but probably becomes more dense distally. Sensory exam normal in hands.     Gait: Narrow and stable.  He was able to walk on his heels and toes. He could perform tandem but was unsteady.     Deep tendon reflexes:   Right Left   Triceps 1 1   Biceps 1 1   Brachioradialis 2 2   Knee jerk 2 2   Ankle jerk 1 1   Plantar responses were flexor bilaterally.       Assessment:    Timoteo Haq is a 58 year old man with an idiopathic sensorimotor polyneuropathy.  Initial NCS from [facility] showed a curious finding of mildly reduced motor CV in the indeterminate range, however a second EMG performed by  us in 2/24 showed reduced amplitude of tibial CMAP's, with otherwise normal CMAP CVs in all nerves tested.  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  We further discussed his diagnosis of idiopathic peripheral neuropathy today, and how we had ruled out several other secondary or reversible causes of neuropathy with lab testing.  Additionally, he underwent a large genetic panel through [employer] for 102 genes of hereditary neuropathies, which was negative.  Fortunately, throughout this last year despite him having subjective changes his function still remains very high and normal.  We have reassured him today that we do not expect any abrupt changes, but otherwise symptomatic management with gabapentin and balance stabilization with PT would be helpful.  Further recommendations as below.    Plan:      1. Labs: None today  2. Balance: Continue PT exercises at home  3. Symptomatic management of pain and paresthesias: Continue gabapentin with a goal dose of 300 mg TID (provided instructions on how to slowly increase from every day to TID). Discussed side effects, including sleepiness and drowsiness. If not tolerated then reduce gabapentin to lowest effective and tolerated dose.  Cymbalta would be a good next option if needed.   4. Encouraged supportive, comfortable footwear  5. Encouraged continued safe, ability appropriate exercise and well balanced nutrition  6. Follow up in 1 year, sooner if needed    Patient seen and discussed with attending Dr. Chapin. His addendum follows.     Dyllan Llanos MD  Neuromuscular Medicine Fellow, PGY-5  [facility]  ---  ADDENDUM:  I personally interviewed and examined the patient. I agree with the history, physical, assessment, and plan as documented above. Changes to the physical examination, assessment and plan have been incorporated into the note by myself, as to make it a single cohesive  document.    Jh Chapin MD

## 2024-06-19 NOTE — LETTER
6/19/2024       RE: Timoteo Haq  [address]  [facility] [address]     Dear Colleague,    Thank you for referring your patient, Timoteo Haq, to the [facility] NEUROLOGY CLIN[facility] [place] [facility]. Please see a copy of my visit note below.    History of neuropathy:    Timoteo Haq is a 58 year old man with idiopathic peripheral neuropathy.  Onset of symptoms was probably around 2016, initially starting on the top of the big toes bilaterally. He did not have pain or paresthesias, but felt that the toe was numb. In the years 2016 to 2022 there was not much change in the toe symptoms. In 2022 he was diagnosed with prostate cancer and in late 2022 underwent prostate surgery. He never received chemotherapy. In 2022, before surgery, his feet changed. The sensations in his toes evolved to include paresthesias. The location spread to include the top of his feet, with notable sparing of the bottom of the feet.  This has been accompanied by intermittent sharp pains of the feet.  Walking does not worsen the pain.  Starting in 2023 his balance worsened, but he is still very active with tennis, walking and hiking, but felt like he was more unsteady. No falls or gait devices.  He has not experienced changes in sweating, anhidrosis, syncope, or early satiety and has not noticed changes in bowel. Bladder changes have developed in tandem with prostate cancer. He lost about 100 pounds in 2022 in the 6 months leading into prostate surgery. This was an intentional weight lost. He describes some subjective changes to the feet and toes, but has continues to be functionally the same, still undergoing long hiking trips without falling and not needing any gait aids.     Interval:  We last saw him 12/11/2023.  Since that time he endorses subjective worsening of paresthesias and possibly pain in the feet and toes bilaterally.  He takes gabapentin 300 mg  "nightly which helps him get to sleep and stay asleep due to reducing cramping and pain of the feet at night.  Most of the symptoms are later in the day and at night, with occasional pain during the day for which she takes Tylenol ibuprofen.  He is worried about the brain fog side effect of gabapentin but is only tried it during the day on 1 or 2 occasions.  He is willing to try gabapentin in the daytime.  He also undergoes physical therapy for which she was given many exercises that he thinks have helped his coordination and balance quite a bit, but thinks he needs to be more diligent about it at home.  He has been more cautious with walking and denies falls.  He continues to play tennis most days throughout the summer and is able to hike long distances without any significant fatigue or falls.  He has several questions today regarding results of the more updated EMG in February, as well as how we can maximize his function and limit symptoms from the neuropathy.    Prior pertinent laboratory work-up:  6/23/22: Normal Hba1c (5.6), TSH  6/19/23: Negative RF  11/20/23: Negative Heb B, Hep C, HIV, YARELY, SPEP (no monoclonal protein)  12/11/2023: B12 353, B1 whole blood 141, B6 61.3  4/19/24: Genetic testing dated 3/23/24 with [employer] panel of 102 CMT genes negative.    Prior electrophysiologic work-up:  4/24/23: Nerve conduction studies/EMG performed at [facility]. Sural small or absent. CV in lower limb motor nerves all around 36 m/s. Tibial small in amplitude. Upper limb motor and sensory responses normal.   2/9/24:     Past Medical History:   Prostate surgery    Past Surgical History:  Prostate surgery  Cholecystectomy  YESENIA  Back pain    Family history:    He thinks his mother had \"neuropathy\". Details otherwise unknown. There is no other known family history of hereditary neuropathies or other neuromuscular disorders.    Social History:    Very rare alcohol use. He denies tobacco or illicit drug use.     Medical Allergies: "  No Known Allergies    Current Medications:    Current Outpatient Medications   Medication Sig Dispense Refill    gabapentin (NEURONTIN) 300 MG capsule Take 1 capsule (300 mg) by mouth 3 times daily 90 capsule 5    ibuprofen (ADVIL/MOTRIN) 100 MG chewable tablet Take 100 mg by mouth every 8 hours as needed for fever      Multiple Vitamin (MULTIVITAMINS PO)       naproxen sodium (ANAPROX) 220 MG tablet Take 220 mg by mouth 2 times daily (with meals)       No current facility-administered medications for this visit.     Review of Systems: A complete review of systems was obtained and was negative except for what was noted above.    Physical examination:    /78 (BP Location: Right arm, Patient Position: Sitting, Cuff Size: Adult Large)   Pulse 72   Resp 16   SpO2 97%      General Appearance: NAD    Skin: There are no rashes or other skin lesions.    Musculoskeletal:  Mild pes cavus    Neurologic examination:    Mental status:  Patient is alert, attentive, and oriented x 3.  Language is coherent and fluent without aphasia.  Memory, comprehension and ability to follow commands were intact.       Cranial nerves:  Pupils were round and reacted to light.  Extraocular movements were full. There was no face, jaw, palate or tongue weakness or atrophy. Hearing was grossly intact.  Shoulder shrug was normal.       Motor exam: No atrophy or fasciculations.  Manual muscle testing revealed MRC grade 5/5 strength throughout including proximal and distal muscles of the arms and legs.    Complex motor skills: No tremor or ataxia    Sensory exam: Vibration reduced to about 10 seconds at ankles and 2-3 seconds at toes. Pin reduced in distal 1/3rd of leg. Patchy but probably becomes more dense distally. Sensory exam normal in hands.     Gait: Narrow and stable.  He was able to walk on his heels and toes. He could perform tandem but was unsteady.     Deep tendon reflexes:   Right Left   Triceps 1 1   Biceps 1 1   Brachioradialis  2 2   Knee jerk 2 2   Ankle jerk 1 1   Plantar responses were flexor bilaterally.       Assessment:    Timoteo Haq is a 58 year old man with an idiopathic sensorimotor polyneuropathy.  Initial NCS from [facility] showed a curious finding of mildly reduced motor CV in the indeterminate range, however a second EMG performed by us in 2/24 showed reduced amplitude of tibial CMAP's, with otherwise normal CMAP CVs in all nerves tested.  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  We further discussed his diagnosis of idiopathic peripheral neuropathy today, and how we had ruled out several other secondary or reversible causes of neuropathy with lab testing.  Additionally, he underwent a large genetic panel through [employer] [facility] for 102 genes of hereditary neuropathies, which was negative.  Fortunately, throughout this last year despite him having subjective changes his function still remains very high and normal.  We have reassured him today that we do not expect any abrupt changes, but otherwise symptomatic management with gabapentin and balance stabilization with PT would be helpful.  Further recommendations as below.    Plan:      1. Labs: None today  2. Balance: Continue PT exercises at home  3. Symptomatic management of pain and paresthesias: Continue gabapentin with a goal dose of 300 mg TID (provided instructions on how to slowly increase from every day to TID). Discussed side effects, including sleepiness and drowsiness. If not tolerated then reduce gabapentin to lowest effective and tolerated dose.  Cymbalta would be a good next option if needed.   4. Encouraged supportive, comfortable footwear  5. Encouraged continued safe, ability appropriate exercise and well balanced nutrition  6. Follow up in 1 year, sooner if needed    Patient seen and discussed with attending Dr. Chapin. His addendum follows.     Dyllan Llanos MD  Neuromuscular Medicine  Fellow, PGY-5  [facility]  ---  ADDENDUM:  I personally interviewed and examined the patient. I agree with the history, physical, assessment, and plan as documented above. Changes to the physical examination, assessment and plan have been incorporated into the note by myself, as to make it a single cohesive document.        Again, thank you for allowing me to participate in the care of your patient.      Sincerely,    Jh Chapin MD

## 2024-06-19 NOTE — NURSING NOTE
Chief Complaint   Patient presents with    RECHECK     Here for a follow up, confirmed with patient     Enedelia Garvey

## 2024-08-31 ENCOUNTER — HEALTH MAINTENANCE LETTER (OUTPATIENT)
Age: 59
End: 2024-08-31